# Patient Record
Sex: FEMALE | Race: WHITE | NOT HISPANIC OR LATINO
[De-identification: names, ages, dates, MRNs, and addresses within clinical notes are randomized per-mention and may not be internally consistent; named-entity substitution may affect disease eponyms.]

---

## 2021-05-18 PROBLEM — Z00.00 ENCOUNTER FOR PREVENTIVE HEALTH EXAMINATION: Status: ACTIVE | Noted: 2021-05-18

## 2021-05-26 ENCOUNTER — LABORATORY RESULT (OUTPATIENT)
Age: 26
End: 2021-05-26

## 2021-05-27 ENCOUNTER — APPOINTMENT (OUTPATIENT)
Dept: OBGYN | Facility: CLINIC | Age: 26
End: 2021-05-27
Payer: COMMERCIAL

## 2021-05-27 VITALS
TEMPERATURE: 98.1 F | HEART RATE: 71 BPM | WEIGHT: 110 LBS | DIASTOLIC BLOOD PRESSURE: 72 MMHG | HEIGHT: 58 IN | BODY MASS INDEX: 23.09 KG/M2 | SYSTOLIC BLOOD PRESSURE: 118 MMHG

## 2021-05-27 DIAGNOSIS — Z80.3 FAMILY HISTORY OF MALIGNANT NEOPLASM OF BREAST: ICD-10-CM

## 2021-05-27 DIAGNOSIS — Z78.9 OTHER SPECIFIED HEALTH STATUS: ICD-10-CM

## 2021-05-27 DIAGNOSIS — Z83.3 FAMILY HISTORY OF DIABETES MELLITUS: ICD-10-CM

## 2021-05-27 DIAGNOSIS — G40.909 EPILEPSY, UNSPECIFIED, NOT INTRACTABLE, W/OUT STATUS EPILEPTICUS: ICD-10-CM

## 2021-05-27 PROCEDURE — 99072 ADDL SUPL MATRL&STAF TM PHE: CPT

## 2021-05-27 PROCEDURE — 99385 PREV VISIT NEW AGE 18-39: CPT

## 2021-05-27 RX ORDER — LEVETIRACETAM 500 MG/1
500 TABLET, FILM COATED, EXTENDED RELEASE ORAL
Refills: 0 | Status: ACTIVE | COMMUNITY

## 2021-05-27 NOTE — COUNSELING
[Nutrition/ Exercise/ Weight Management] : nutrition, exercise, weight management [Sunscreen] : sunscreen [Breast Self Exam] : breast self exam [Contraception/ Emergency Contraception/ Safe Sexual Practices] : contraception, emergency contraception, safe sexual practices

## 2021-05-27 NOTE — PHYSICAL EXAM
[Appropriately responsive] : appropriately responsive [Alert] : alert [No Acute Distress] : no acute distress [No Lymphadenopathy] : no lymphadenopathy [Soft] : soft [Non-tender] : non-tender [Non-distended] : non-distended [No HSM] : No HSM [No Lesions] : no lesions [No Mass] : no mass [Oriented x3] : oriented x3 [Examination Of The Breasts] : a normal appearance [No Discharge] : no discharge [No Masses] : no breast masses were palpable [Labia Majora] : normal [Labia Minora] : normal [Normal] : normal [Uterine Adnexae] : normal Other

## 2021-05-27 NOTE — DISCUSSION/SUMMARY
[FreeTextEntry1] : \par 25 yo P0 for annual exam , epilepsy\par pap hpv\par declined contraception\par preconception counseling\par BRCA discussed, however ? shaista will call  sujit to speak to genetic counselor

## 2021-05-27 NOTE — HISTORY OF PRESENT ILLNESS
[Patient reported PAP Smear was normal] : Patient reported PAP Smear was normal [Menarche Age: ____] : age at menarche was [unfilled] [No] : Patient does not have concerns regarding sex [Currently Active] : currently active [TextBox_4] : \par GYNHX\par No fibroids cysts or std [PapSmeardate] : 2019 [FreeTextEntry1] : 5-

## 2021-06-08 LAB
CYTOLOGY CVX/VAG DOC THIN PREP: ABNORMAL
HPV HIGH+LOW RISK DNA PNL CVX: DETECTED

## 2021-06-09 ENCOUNTER — NON-APPOINTMENT (OUTPATIENT)
Age: 26
End: 2021-06-09

## 2022-09-12 ENCOUNTER — APPOINTMENT (OUTPATIENT)
Dept: OBGYN | Facility: CLINIC | Age: 27
End: 2022-09-12

## 2022-09-12 VITALS
WEIGHT: 115 LBS | HEART RATE: 69 BPM | SYSTOLIC BLOOD PRESSURE: 102 MMHG | BODY MASS INDEX: 24.14 KG/M2 | DIASTOLIC BLOOD PRESSURE: 72 MMHG | TEMPERATURE: 97.8 F | HEIGHT: 58 IN

## 2022-09-12 DIAGNOSIS — Z01.419 ENCOUNTER FOR GYNECOLOGICAL EXAMINATION (GENERAL) (ROUTINE) W/OUT ABNORMAL FINDINGS: ICD-10-CM

## 2022-09-12 PROCEDURE — 99395 PREV VISIT EST AGE 18-39: CPT

## 2022-09-12 NOTE — DISCUSSION/SUMMARY
[FreeTextEntry1] : \par 28 yo P0 for annual exam , h/o  epilepsy, pelvic cramps\par pap hpv\par Monitor cramping if worsens return to office for sonogram\par \par

## 2022-09-12 NOTE — PHYSICAL EXAM
[Appropriately responsive] : appropriately responsive [Alert] : alert [No Acute Distress] : no acute distress [No Lymphadenopathy] : no lymphadenopathy [Soft] : soft [Non-tender] : non-tender [Non-distended] : non-distended [No HSM] : No HSM [No Lesions] : no lesions [No Mass] : no mass [Oriented x3] : oriented x3 [Examination Of The Breasts] : a normal appearance [No Discharge] : no discharge [No Masses] : no breast masses were palpable [Labia Majora] : normal [Labia Minora] : normal [No Bleeding] : There was no active vaginal bleeding [Normal] : normal [Normal Position] : in a normal position [Uterine Adnexae] : normal

## 2022-09-12 NOTE — HISTORY OF PRESENT ILLNESS
[Patient reported PAP Smear was normal] : Patient reported PAP Smear was normal [TextBox_4] : \par GYNHX\par No fibroids cysts or std\par Regular periods [PapSmeardate] : 2019 [Menarche Age: ____] : age at menarche was [unfilled] [FreeTextEntry1] : 5- [No] : Patient does not have concerns regarding sex [Currently Active] : currently active

## 2022-09-14 LAB — HPV HIGH+LOW RISK DNA PNL CVX: NOT DETECTED

## 2022-09-21 ENCOUNTER — NON-APPOINTMENT (OUTPATIENT)
Age: 27
End: 2022-09-21

## 2022-09-21 LAB — CYTOLOGY CVX/VAG DOC THIN PREP: ABNORMAL

## 2022-11-01 ENCOUNTER — APPOINTMENT (OUTPATIENT)
Dept: NEUROPSYCHOLOGY | Facility: CLINIC | Age: 27
End: 2022-11-01

## 2022-12-01 ENCOUNTER — APPOINTMENT (OUTPATIENT)
Dept: PAIN MANAGEMENT | Facility: CLINIC | Age: 27
End: 2022-12-01

## 2022-12-01 VITALS — WEIGHT: 115 LBS | HEIGHT: 58 IN | BODY MASS INDEX: 24.14 KG/M2

## 2022-12-01 DIAGNOSIS — M54.12 RADICULOPATHY, CERVICAL REGION: ICD-10-CM

## 2022-12-01 DIAGNOSIS — M54.16 RADICULOPATHY, LUMBAR REGION: ICD-10-CM

## 2022-12-01 PROCEDURE — 99215 OFFICE O/P EST HI 40 MIN: CPT

## 2022-12-01 PROCEDURE — 99072 ADDL SUPL MATRL&STAF TM PHE: CPT

## 2022-12-01 NOTE — ASSESSMENT
[FreeTextEntry1] : 27 year old female presenting with neck and lower back pain. She will continue with conservative care. She can follow up if her pain flares up. \par \par Entered by Florina Rodriguez, acting as scribe for Dr. Munoz.\par  \par The documentation recorded by the scribe, in my presence, accurately reflects the service I personally performed, and the decisions made by me with my edits as appropriate.\par  \par Best Regards, \par Ricardo Munoz MD \par Board Certified, Anesthesiology \par Board Certified, Pain Medicine\par \par
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
## 2022-12-01 NOTE — DATA REVIEWED
[FreeTextEntry1] : MRI Cervical spine taken on 2- at [facility] shows right paracentral broad-based disc herniation at C3-C4 indenting the thecal sac and right formula. Disc bulge at C5-C6 indenting the thecal sac. Straightening of the normal lordosis and cervical dextroscoliosis, which may be secondary to spasm.\par \par MRI Thoracic spine taken on 2- at [facility] shows no evidence for disc bulge or herniation.\par  \par SOAPP: Scored a 0 , low risk.\par  \par [place] REGISTRY: Reviewed .  \par  \par UDS: No data obtained today. \par  \par Medications that trigger a UDS: Benzodiazepines (Ativan, Xanax, Valium) etc, Barbiturates, Narcotics (Avinza, Butrans, hydrocodone, Codeine, Alejandra, Methadone, Morphine, MS Contin, Opana, oxycodone, Oxycontin, Suboxone etc), Pregabalin (Lyrica), Tramadol (Ultacet, Utram etc), Tapentadol, (Nucynta) and Elist Drugs (cocaine, THC, Etc.)\par  \par Risk factors: Bipolar Illness, positive for any an illicit drugs, history of any ETOH and drug abuse, any signs of diversion, Sharing Meds, selling meds. Non consistent [place] drug reporting and above 120meq of morphine\par  \par Low risk: Patient has combination of a low risk SOAP and no risk factors. UDS would be repeated randomly every quarter\par

## 2022-12-01 NOTE — PHYSICAL EXAM
[de-identified] : Constitutional\par GENERAL APPEARANCE OF PATIENT IS WELL DEVELOPED, WELL NOURISHED, BODY HABITUS NORMAL, WELL GROOMED, NO DEFORMITIES NOTED. \par \par Head\par -          Atraumatic and Normocephalic \par \par Eyes, Nose, and Throat:\par -          External inspection of ears and nose are normal overall without scars, lesions, or masses noted\par -          Assessment of hearing is normal\par \par Neck\par -          Examination of neck shows no masses, overall appearance is normal, neck is symmetric, tracheal position is midline, no crepitus is noted\par -          Examination of thyroid shows no enlargement, tenderness or masses\par \par Respiratory\par -          Assessment of respiratory effort shows no intercostal retractions, no use of accessory muscles, unlabored breathing, and normal diaphragmatic movement.\par \par Cardiovascular\par -          Examination of extremities show no edema or varicosities\par \par Musculoskeletal\par -           Inspection and palpation of digits and nails shows no clubbing, cyanosis, nodules, drainage, fluctuance, petechiae\par \par 1)         Spine- ROM restricted. \par \par 2)         Neck- ROM restricted. \par \par \par Skin\par -           Inspection of skin and subcutaneous tissue shows no rashes, lesions or ulcers\par -           Palpation of skin and subcutaneous tissue shows no rashes, no indurations, subcutaneous nodules or tightening.\par \par  Abdomen\par -           Soft; Non-tender\par \par Neurologic\par -           CN 2-12 are grossly intact\par -           No sensory or motor deficits in the upper and lower extremities\par -           Adequate strength in upper and lower extremities\par \par Psychiatric\par -           Patients judgment and insight are intact\par -           Oriented to time, place and person\par -           Recent and remote memory intact.\par

## 2023-03-21 ENCOUNTER — FORM ENCOUNTER (OUTPATIENT)
Age: 28
End: 2023-03-21

## 2023-03-22 ENCOUNTER — FORM ENCOUNTER (OUTPATIENT)
Age: 28
End: 2023-03-22